# Patient Record
Sex: MALE | Race: OTHER | HISPANIC OR LATINO | URBAN - METROPOLITAN AREA
[De-identification: names, ages, dates, MRNs, and addresses within clinical notes are randomized per-mention and may not be internally consistent; named-entity substitution may affect disease eponyms.]

---

## 2020-08-07 ENCOUNTER — EMERGENCY (EMERGENCY)
Facility: HOSPITAL | Age: 70
LOS: 1 days | Discharge: HOME | End: 2020-08-07
Attending: EMERGENCY MEDICINE | Admitting: EMERGENCY MEDICINE
Payer: MEDICARE

## 2020-08-07 VITALS
RESPIRATION RATE: 16 BRPM | DIASTOLIC BLOOD PRESSURE: 88 MMHG | WEIGHT: 160.06 LBS | OXYGEN SATURATION: 98 % | TEMPERATURE: 98 F | HEART RATE: 68 BPM | HEIGHT: 64 IN | SYSTOLIC BLOOD PRESSURE: 131 MMHG

## 2020-08-07 VITALS
HEART RATE: 70 BPM | DIASTOLIC BLOOD PRESSURE: 68 MMHG | RESPIRATION RATE: 16 BRPM | SYSTOLIC BLOOD PRESSURE: 135 MMHG | OXYGEN SATURATION: 99 % | TEMPERATURE: 98 F

## 2020-08-07 PROCEDURE — 72110 X-RAY EXAM L-2 SPINE 4/>VWS: CPT | Mod: 26

## 2020-08-07 PROCEDURE — 73502 X-RAY EXAM HIP UNI 2-3 VIEWS: CPT | Mod: 26,RT

## 2020-08-07 PROCEDURE — 99284 EMERGENCY DEPT VISIT MOD MDM: CPT

## 2020-08-07 PROCEDURE — 73502 X-RAY EXAM HIP UNI 2-3 VIEWS: CPT

## 2020-08-07 PROCEDURE — 99284 EMERGENCY DEPT VISIT MOD MDM: CPT | Mod: 25

## 2020-08-07 PROCEDURE — 72110 X-RAY EXAM L-2 SPINE 4/>VWS: CPT

## 2020-08-07 RX ORDER — LIDOCAINE 4 G/100G
1 CREAM TOPICAL ONCE
Refills: 0 | Status: COMPLETED | OUTPATIENT
Start: 2020-08-07 | End: 2020-08-07

## 2020-08-07 RX ORDER — LIDOCAINE 4 G/100G
1 CREAM TOPICAL
Qty: 30 | Refills: 0
Start: 2020-08-07 | End: 2020-09-05

## 2020-08-07 RX ORDER — CYCLOBENZAPRINE HYDROCHLORIDE 10 MG/1
1 TABLET, FILM COATED ORAL
Qty: 21 | Refills: 0
Start: 2020-08-07

## 2020-08-07 RX ADMIN — LIDOCAINE 1 PATCH: 4 CREAM TOPICAL at 15:16

## 2020-08-07 RX ADMIN — Medication 500 MILLIGRAM(S): at 15:26

## 2020-08-07 RX ADMIN — Medication 500 MILLIGRAM(S): at 15:17

## 2020-08-07 NOTE — ED PROVIDER NOTE - MUSCULOSKELETAL, MLM
Spine appears normal, range of motion is not limited. +point tenderness right buttock, posterior hip area, no joint deformity or tenderness, full ROM, pulses and sensation intact

## 2020-08-07 NOTE — ED PROVIDER NOTE - OBJECTIVE STATEMENT
69 year old male with a PMHx of Arthritis, DM, HTN presents to the ED for pain on his right buttock x 6-7 months, worsening. Pt states that he has been taking Tylenol with minimal relief. No pain on the left side. Pt has not seen is doctor for this pain. Has had an MRI 10 years ago fro his Arthritis. PCP: Olivia Bell

## 2020-08-07 NOTE — ED PROVIDER NOTE - PATIENT PORTAL LINK FT
You can access the FollowMyHealth Patient Portal offered by [facility] by registering at the following website: [email or web address]. By joining [employer]’s FollowMyHealth portal, you will also be able to view your health information using other applications (apps) compatible with our system.

## 2020-08-07 NOTE — ED ADULT NURSE NOTE - OBJECTIVE STATEMENT
Pt came in ambulatory A/Ox 4 complains of intermittent worsening right hip/ buttock pain x 7 months now. Pt stated that he unable to make an appointment with his PMD and pain is worsening.

## 2020-08-07 NOTE — ED PROVIDER NOTE - NSFOLLOWUPINSTRUCTIONS_ED_ALL_ED_FT
Sciatica     Sciatica is pain, weakness, tingling, or loss of feeling (numbness) along the sciatic nerve. The sciatic nerve starts in the lower back and goes down the back of each leg. Sciatica usually goes away on its own or with treatment. Sometimes, sciatica may come back (recur).  What are the causes?  This condition happens when the sciatic nerve is pinched or has pressure put on it. This may be the result of:  A disk in between the bones of the spine bulging out too far (herniated disk).Changes in the spinal disks that occur with aging.A condition that affects a muscle in the butt.Extra bone growth near the sciatic nerve.A break (fracture) of the area between your hip bones (pelvis).Pregnancy. Tumor. This is rare.What increases the risk?  You are more likely to develop this condition if you:  Play sports that put pressure or stress on the spine.Have poor strength and ease of movement (flexibility).Have had a back injury in the past.Have had back surgery.Sit for long periods of time.Do activities that involve bending or lifting over and over again.Are very overweight (obese).What are the signs or symptoms?  Symptoms can vary from mild to very bad. They may include:  Any of these problems in the lower back, leg, hip, or butt:  Mild tingling, loss of feeling, or dull aches.Burning sensations.Sharp pains. Loss of feeling in the back of the calf or the sole of the foot.Leg weakness. Very bad back pain that makes it hard to move.These symptoms may get worse when you cough, sneeze, or laugh. They may also get worse when you sit or stand for long periods of time.  How is this treated?  This condition often gets better without any treatment. However, treatment may include:  Changing or cutting back on physical activity when you have pain.Doing exercises and stretching.Putting ice or heat on the affected area.Medicines that help:   To relieve pain and swelling. To relax your muscles. Shots (injections) of medicines that help to relieve pain, irritation, and swelling.Surgery.Follow these instructions at home:  Medicines     Take over-the-counter and prescription medicines only as told by your doctor.Ask your doctor if the medicine prescribed to you:  Requires you to avoid driving or using heavy machinery.Can cause trouble pooping (constipation). You may need to take these steps to prevent or treat trouble pooping:  Drink enough fluids to keep your pee (urine) pale yellow.Take over-the-counter or prescription medicines.Eat foods that are high in fiber. These include beans, whole grains, and fresh fruits and vegetables.Limit foods that are high in fat and sugar. These include fried or sweet foods.Managing pain         If told, put ice on the affected area.  Put ice in a plastic bag.Place a towel between your skin and the bag.Leave the ice on for 20 minutes, 2–3 times a day.If told, put heat on the affected area. Use the heat source that your doctor tells you to use, such as a moist heat pack or a heating pad.  Place a towel between your skin and the heat source.Leave the heat on for 20–30 minutes.Remove the heat if your skin turns bright red. This is very important if you are unable to feel pain, heat, or cold. You may have a greater risk of getting burned.Activity        Return to your normal activities as told by your doctor. Ask your doctor what activities are safe for you.Avoid activities that make your symptoms worse.Take short rests during the day.  When you rest for a long time, do some physical activity or stretching between periods of rest.Avoid sitting for a long time without moving. Get up and move around at least one time each hour.Exercise and stretch regularly, as told by your doctor.Do not lift anything that is heavier than 10 lb (4.5 kg) while you have symptoms of sciatica.  Avoid lifting heavy things even when you do not have symptoms.Avoid lifting heavy things over and over.When you lift objects, always lift in a way that is safe for your body. To do this, you should:  Bend your knees.Keep the object close to your body.Avoid twisting.General instructions     Stay at a healthy weight.Wear comfortable shoes that support your feet. Avoid wearing high heels.Avoid sleeping on a mattress that is too soft or too hard. You might have less pain if you sleep on a mattress that is firm enough to support your back.Keep all follow-up visits as told by your doctor. This is important.Contact a doctor if:  You have pain that:  Wakes you up when you are sleeping.Gets worse when you lie down.Is worse than the pain you have had in the past.Lasts longer than 4 weeks.You lose weight without trying.Get help right away if:  You cannot control when you pee (urinate) or poop (have a bowel movement).You have weakness in any of these areas and it gets worse:  Lower back.The area between your hip bones.Butt.Legs.You have redness or swelling of your back.You have a burning feeling when you pee.Summary  Sciatica is pain, weakness, tingling, or loss of feeling (numbness) along the sciatic nerve.This condition happens when the sciatic nerve is pinched or has pressure put on it.Sciatica can cause pain, tingling, or loss of feeling (numbness) in the lower back, legs, hips, and butt.Treatment often includes rest, exercise, medicines, and putting ice or heat on the affected area.This information is not intended to replace advice given to you by your health care provider. Make sure you discuss any questions you have with your health care provider.

## 2020-08-07 NOTE — ED PROVIDER NOTE - CARE PROVIDER_API CALL
Henrik Arambula  ORTHOPAEDIC SPORTS MEDICINE  [address] [place] [address]  Phone: [phone]  Fax: [phone]  Follow Up Time: 1-3 Days

## 2020-08-07 NOTE — ED PROVIDER NOTE - CLINICAL SUMMARY MEDICAL DECISION MAKING FREE TEXT BOX
Impression is right buttock pain, r/o Sciatica and Arthritis. Plan XR, pain medications and ortho follow up.

## 2020-09-02 ENCOUNTER — FORM ENCOUNTER (OUTPATIENT)
Age: 70
End: 2020-09-02

## 2020-11-17 ENCOUNTER — FORM ENCOUNTER (OUTPATIENT)
Age: 70
End: 2020-11-17

## 2021-01-11 ENCOUNTER — FORM ENCOUNTER (OUTPATIENT)
Age: 71
End: 2021-01-11

## 2021-02-09 ENCOUNTER — FORM ENCOUNTER (OUTPATIENT)
Age: 71
End: 2021-02-09

## 2021-02-09 PROBLEM — I10 ESSENTIAL (PRIMARY) HYPERTENSION: Chronic | Status: ACTIVE | Noted: 2020-08-07

## 2021-02-09 PROBLEM — E11.9 TYPE 2 DIABETES MELLITUS WITHOUT COMPLICATIONS: Chronic | Status: ACTIVE | Noted: 2020-08-07

## 2021-02-15 ENCOUNTER — FORM ENCOUNTER (OUTPATIENT)
Age: 71
End: 2021-02-15

## 2021-02-17 ENCOUNTER — FORM ENCOUNTER (OUTPATIENT)
Age: 71
End: 2021-02-17

## 2021-03-09 ENCOUNTER — FORM ENCOUNTER (OUTPATIENT)
Age: 71
End: 2021-03-09

## 2021-03-14 ENCOUNTER — FORM ENCOUNTER (OUTPATIENT)
Age: 71
End: 2021-03-14

## 2021-03-29 ENCOUNTER — FORM ENCOUNTER (OUTPATIENT)
Age: 71
End: 2021-03-29

## 2021-03-30 ENCOUNTER — OUTPATIENT (OUTPATIENT)
Dept: OUTPATIENT SERVICES | Facility: HOSPITAL | Age: 71
LOS: 1 days | Discharge: HOME | End: 2021-03-30

## 2021-03-30 DIAGNOSIS — N52.9 MALE ERECTILE DYSFUNCTION, UNSPECIFIED: ICD-10-CM

## 2021-03-30 LAB — GLUCOSE BLDC GLUCOMTR-MCNC: 136 MG/DL — HIGH (ref 70–99)

## 2021-03-30 NOTE — BRIEF OPERATIVE NOTE - NSICDXBRIEFPROCEDURE_GEN_ALL_CORE_FT
PROCEDURES:  Insertion of multi-component inflatable penile prosthesis 30-Mar-2021 14:04:26  Darrian Augustine

## 2021-04-07 ENCOUNTER — FORM ENCOUNTER (OUTPATIENT)
Age: 71
End: 2021-04-07

## 2021-04-11 ENCOUNTER — FORM ENCOUNTER (OUTPATIENT)
Age: 71
End: 2021-04-11

## 2021-04-16 NOTE — ED ADULT NURSE NOTE - SUICIDE SCREENING QUESTION 3
Patient aware and verbalized understanding.  Patient would like to come in for labs without an appointment.  Standing order labs placed for 4 times.    Staff message placed to enter more if needed.   No

## 2021-04-21 ENCOUNTER — FORM ENCOUNTER (OUTPATIENT)
Age: 71
End: 2021-04-21

## 2021-09-19 ENCOUNTER — FORM ENCOUNTER (OUTPATIENT)
Age: 71
End: 2021-09-19

## 2021-10-04 ENCOUNTER — FORM ENCOUNTER (OUTPATIENT)
Age: 71
End: 2021-10-04

## 2021-10-25 ENCOUNTER — FORM ENCOUNTER (OUTPATIENT)
Age: 71
End: 2021-10-25

## 2021-11-03 ENCOUNTER — FORM ENCOUNTER (OUTPATIENT)
Age: 71
End: 2021-11-03

## 2021-11-15 ENCOUNTER — FORM ENCOUNTER (OUTPATIENT)
Age: 71
End: 2021-11-15

## 2021-11-18 ENCOUNTER — FORM ENCOUNTER (OUTPATIENT)
Age: 71
End: 2021-11-18

## 2021-11-19 ENCOUNTER — OUTPATIENT (OUTPATIENT)
Dept: OUTPATIENT SERVICES | Facility: HOSPITAL | Age: 71
LOS: 1 days | Discharge: HOME | End: 2021-11-19

## 2021-11-19 LAB — GLUCOSE BLDC GLUCOMTR-MCNC: 138 MG/DL — HIGH (ref 70–99)

## 2021-12-01 ENCOUNTER — FORM ENCOUNTER (OUTPATIENT)
Age: 71
End: 2021-12-01

## 2021-12-22 ENCOUNTER — NON-APPOINTMENT (OUTPATIENT)
Age: 71
End: 2021-12-22

## 2021-12-22 ENCOUNTER — APPOINTMENT (OUTPATIENT)
Dept: ORTHOPEDIC SURGERY | Facility: CLINIC | Age: 71
End: 2021-12-22
Payer: MEDICARE

## 2021-12-22 VITALS
WEIGHT: 162 LBS | OXYGEN SATURATION: 96 % | HEIGHT: 64 IN | SYSTOLIC BLOOD PRESSURE: 165 MMHG | DIASTOLIC BLOOD PRESSURE: 91 MMHG | BODY MASS INDEX: 27.66 KG/M2 | HEART RATE: 65 BPM

## 2021-12-22 DIAGNOSIS — M54.41 LUMBAGO WITH SCIATICA, RIGHT SIDE: ICD-10-CM

## 2021-12-22 PROCEDURE — 99204 OFFICE O/P NEW MOD 45 MIN: CPT

## 2021-12-22 NOTE — PHYSICAL EXAM

## 2021-12-22 NOTE — HISTORY OF PRESENT ILLNESS
[de-identified] : This is a 71-year-old male that is here today for evaluation of his back and right-sided buttock and posterior thigh pain.  Unfortunately symptoms of been worsening over the past several months.  Although he can walk 5 blocks he does have back pain and leg pain when he walks at times.  He denies any bowel bladder issues.  He denies any saddle anesthesia.

## 2022-11-23 ENCOUNTER — EMERGENCY (EMERGENCY)
Facility: HOSPITAL | Age: 72
LOS: 1 days | Discharge: HOME | End: 2022-11-23
Attending: EMERGENCY MEDICINE | Admitting: EMERGENCY MEDICINE
Payer: MEDICARE

## 2022-11-23 VITALS
HEART RATE: 67 BPM | TEMPERATURE: 98 F | SYSTOLIC BLOOD PRESSURE: 144 MMHG | RESPIRATION RATE: 16 BRPM | WEIGHT: 177.91 LBS | OXYGEN SATURATION: 97 % | DIASTOLIC BLOOD PRESSURE: 82 MMHG | HEIGHT: 64 IN

## 2022-11-23 PROCEDURE — 99283 EMERGENCY DEPT VISIT LOW MDM: CPT

## 2022-11-23 PROCEDURE — 96372 THER/PROPH/DIAG INJ SC/IM: CPT

## 2022-11-23 PROCEDURE — 99284 EMERGENCY DEPT VISIT MOD MDM: CPT

## 2022-11-23 RX ORDER — TRAMADOL HYDROCHLORIDE 50 MG/1
1 TABLET ORAL
Qty: 20 | Refills: 0
Start: 2022-11-23 | End: 2022-11-25

## 2022-11-23 RX ORDER — KETOROLAC TROMETHAMINE 30 MG/ML
30 SYRINGE (ML) INJECTION ONCE
Refills: 0 | Status: DISCONTINUED | OUTPATIENT
Start: 2022-11-23 | End: 2022-11-23

## 2022-11-23 RX ORDER — LIDOCAINE 4 G/100G
1 CREAM TOPICAL ONCE
Refills: 0 | Status: COMPLETED | OUTPATIENT
Start: 2022-11-23 | End: 2022-11-23

## 2022-11-23 RX ADMIN — LIDOCAINE 1 PATCH: 4 CREAM TOPICAL at 10:59

## 2022-11-23 RX ADMIN — Medication 30 MILLIGRAM(S): at 11:30

## 2022-11-23 RX ADMIN — Medication 30 MILLIGRAM(S): at 10:59

## 2022-11-23 NOTE — ED PROVIDER NOTE - CLINICAL SUMMARY MEDICAL DECISION MAKING FREE TEXT BOX
#[id number]  Patient complaining of greater than 6 months of posterior neck pain for which he had a work-up including x-rays and MRI while he was in the [place] 2 months ago.  Patient was diagnosed with arthritis recommended for physical therapy but did not have patient has taken some Tylenol with mild relief.  Patient denies fevers chills weakness numbness trauma headache dizziness nausea vomiting.  Plan Toradol and lidocaine patch for analgesia outpatient follow-up with spine

## 2022-11-23 NOTE — ED PROVIDER NOTE - OBJECTIVE STATEMENT
72-year-old male with history of hypertension and diabetes presents with neck pain for the past 6 to 7 months.  Denies any injury or trauma.  Had x-rays and MRI in [place] 2 months ago, diagnosed with arthritis.  Recommended physical therapy but patient declined at that time since he has had physical therapy in the past which did not help.  was recommended to start taking collagen. Has been taking Tylenol over-the-counter with occasional mild improvement of pain.  Pain same quality and character as chronic pain he has been having the past 6 to 7 months.  Denies any fevers or recent illnesses.  Denies any history of cancer or recent unexplained weight loss.  Denies any weakness in extremities or numbness or tingling in extremities.  Pain moderate in nature.  Denies any headache, dizziness, confusion, one-sided weakness, or difficulty walking.  PCP Billy   [id number]

## 2022-11-23 NOTE — ED ADULT NURSE NOTE - DOES PATIENT HAVE ADVANCE DIRECTIVE
Use meloxicam instead of ibuprofen.  No prednisone today because I would like your hands to be red and swollen yet in 2 days when you see the rheumatologist.  Use heat and/or ice as it helps and perhaps try topical Lidoderm patches which you have  Take Cipro twice daily for presumed infection but obtain a urinalysis especially if any other blood work is obtained.  As we discussed this may be a kidney/bladder problem related to what we think is a connective tissue immune disorder.  
Unk

## 2022-11-23 NOTE — ED PROVIDER NOTE - CARE PROVIDER_API CALL
Stuart Lopez)  Orthopaedic Surgery Surgery  [address] [place] [address]  Phone: [phone]  Fax: [phone]  Follow Up Time: 1-3 Days    Joey Rosado)  Orthopaedic Surgery  [address] [place] [address]  Phone: [phone]  Fax: [phone]  Follow Up Time: 1-3 Days

## 2022-11-23 NOTE — ED PROVIDER NOTE - PROVIDER TOKENS
PROVIDER:[TOKEN:[[id number]],FOLLOWUP:[1-3 Days]],PROVIDER:[TOKEN:[[id number]],FOLLOWUP:[1-3 Days]]

## 2022-11-23 NOTE — ED PROVIDER NOTE - PROGRESS NOTE DETAILS
Patient stable.  Overall pain improved after Toradol.  Had recent x-rays and MRI 2 months ago which showed arthritic changes.  No red flags.  Stretches to neck explained and demonstrated.  Short course of tramadol prescribed for pain.  Recommend follow-up with spine specialist, referrals provided if needed.

## 2022-11-23 NOTE — ED PROVIDER NOTE - PATIENT PORTAL LINK FT
You can access the FollowMyHealth Patient Portal offered by [facility] by registering at the following website: [email or web address]. By joining [employer]’s FollowMyHealth portal, you will also be able to view your health information using other applications (apps) compatible with our system.

## 2022-11-23 NOTE — ED PROVIDER NOTE - NSFOLLOWUPINSTRUCTIONS_ED_ALL_ED_FT
Tylenol over-the-counter for pain  Short course of tramadol prescribed to take for severe pain that is not controlled with Tylenol  Ice or moist heat  Salonpas lidocaine patches over-the-counter, 12 hours on 12 hours off  Follow-up with spine specialist, referral provided if needed      Dolor en el rashid    LO QUE NECESITA SABER:    Es posible que usted tenga dolor de rashid repentino que aumenta rápidamente. O, en vez de esto, es posible que usted sienta que el dolor aumenta con el tiempo. El dolor de rashid podría desaparecer en unos días o semanas, o podría continuar por meses. El dolor podría ir y venir, o podría empeorar con ciertos movimientos. El dolor podría sentirse solamente en carter rashid, o podría pasarse a tori brazos, espalda u hombros. También podría sentir dolor que comienza en otra área de carter cuerpo y se pasa a carter rashid. Algunos tipos de dolor de rashid son permanentes.  [place] vertebral         INSTRUCCIONES SOBRE EL ARANZA HOSPITALARIA:    Regrese a la nargis de emergencias si:  •Usted tiene ike lesión que le provoca dolor en el rashid y dolor punzante debajo de tori brazos o piernas.      •Carter dolor de rashid se agrava repentinamente.      •Usted tiene dolor de rashid junto con entumecimiento, hormigueo o debilidad en tori brazos o piernas.      •Usted tiene rigidez en el rashid, dolor de song y fiebre.      Comuníquese con carter médico si:  •Usted tiene nuevos síntomas o tori síntomas empeoran.      •Tori síntomas continúan aún después del tratamiento.      •Usted tiene preguntas o inquietudes acerca de carter condición o cuidado.      Medicamentos:Es posible que usted necesite alguno de los siguientes:   •AINEcomo el ibuprofeno, ayudan a disminuir la inflamación, el dolor y la fiebre. Estos medicamentos pueden ser comprados sin orden de un médico. Pregunte a carter médico cuál medicamento chhaya y con qué frecuencia. Siga las indicaciones. Cuando no se sammy de la manera indicada, los medicamentos antiinflamatorios no esteroides pueden causar sangrado estomacal o problemas renales. Si usted está tomando un anticoagulante, siempre pregunte si los DICK son seguros para usted.      •Acetaminofénsirve para calmar el dolor y bajar la fiebre del yuliya. Pregunte a carter médico cuánto chhaya y con qué frecuencia. Siga las indicaciones. El acetaminofén puede causar daño en el hígado cuando no se nathan de forma correcta.      •Medicamentos esteroideospodría usarse para reducir la inflamación. [place] puede ayudarlo a aliviar el dolor a causa de la inflamación.      •[place] tori medicamentos darci se le haya indicado.Consulte con carter médico si usted sheryl que carter medicamento no le está ayudando o si presenta efectos secundarios. Infórmele al médico si usted es alérgico a algún medicamento. Mantenga ike lista actualizada de los medicamentos, las vitaminas y los productos herbales que nathan. Incluya los siguientes datos de los medicamentos: cantidad, frecuencia y motivo de administración. Traiga con usted la lista o los envases de las píldoras a tori citas de seguimiento. Lleve la lista de los medicamentos con usted en humble de ike emergencia.      Controle o evite el dolor de rashid:  •Repose el rashid darci se lo indiquen.No realice movimientos repentinos, darci voltear carter song rápidamente. Carter médico podría recomendarle que use un collarín cervical por un período corto de tiempo. El collarín evitará que usted mueva carter song. [place] ayudará a darle tiempo a carter rashid para que sane si es que ike lesión está provocando carter dolor. Pregunte a carter médico cuándo puede volver a practicar deportes o realizar otras actividades cotidianas.      •Aplique calor según indicaciones.El calor ayuda a aliviar el dolor y la inflamación. Use ike envoltura caliente o empape ike toalla pequeña en agua tibia. Escurra el exceso de agua. Aplique la venda caliente o la toalla por 20 minutos cada hora o darci se le indique.      •Aplique hielo según las indicaciones.El hielo ayuda a aliviar el dolor y la inflamación, y a evitar daño al tejido. Use un paquete con hielo o ponga hielo en ike bolsa. Cubra el paquete con hielo o la espalda con ike toalla antes de aplicarlo en carter rashid. Aplique el paquete de hielo o la bolsa por 15 minutos cada hora o darci se lo indiquen. Carter médico puede indicarle con qué frecuencia aplicar el hielo.      •Farhan ejercicios para el rashid darci se lo indiquen.Los ejercicios para el rashid ayudan a fortalecer los músculos y a aumentar el rango de movimiento. Carter médico le indicará cuáles ejercicios son adecuados para usted. Podría darle instrucciones o recomendarle que trabaje con un fisioterapeuta. Carter médico o terapeuta pueden asegurarse de que usted esté haciendo estos ejercicios correctamente.      •Mantenga ike buena postura.Trate de mantener carter song y hombros levantados cuando se siente. Si usted trabaja en frente de ike computadora, asegúrese de que el monitor esté al nivel adecuado. Usted no debería tener que mirar hacia abajo para cori la pantalla. Tampoco debe tener que inclinarse hacia adelante para poder leer lo que está en la pantalla. Asegúrese de que carter teclado, ratón y otros artículos de computadora estén colocados en donde usted no tenga que extender tori hombros para alcanzarlos. [place] a menudo si usted trabaja frente a ike computadora o permanece sentado joey largos períodos. Estírese o camine para mantener los músculos de carter rashid relajados.  Ergonomía adecuada           Acuda a tori consultas de control con carter médico según le indicaron:Carter médico podría referirlo a un especialista si carter dolor no mejora con el tratamiento. Anote tori preguntas para que se acuerde de hacerlas joey tori visitas.

## 2022-11-23 NOTE — ED PROVIDER NOTE - MUSCULOSKELETAL, MLM
diffuse soft tissue tenderness to cervical region. no pain with axial loading. full ROM, pain with rotation

## 2022-11-23 NOTE — ED ADULT NURSE NOTE - OBJECTIVE STATEMENT
71 y/o M PMH HTN, HLD, T2DM, presenting to ED with neck pain x 9 months. States he has been seeing spine doctor and had MRI done, told he was going to be medically managed but now has worsening pain. CLARK x4. Denies CP, SOB, n/v/d, fevers, chills, abdominal pain, urinary symptoms, weakness, fatigue, numbness, tingling in upper and lower extremities, HA, blurry vision. VSS updated on plan of care.

## 2023-01-12 ENCOUNTER — APPOINTMENT (OUTPATIENT)
Dept: UROLOGY | Facility: CLINIC | Age: 73
End: 2023-01-12
Payer: MEDICARE

## 2023-01-12 VITALS
WEIGHT: 168 LBS | HEIGHT: 64 IN | BODY MASS INDEX: 28.68 KG/M2 | SYSTOLIC BLOOD PRESSURE: 132 MMHG | DIASTOLIC BLOOD PRESSURE: 78 MMHG | TEMPERATURE: 98.2 F

## 2023-01-12 PROCEDURE — 99214 OFFICE O/P EST MOD 30 MIN: CPT

## 2023-01-12 NOTE — HISTORY OF PRESENT ILLNESS
[FreeTextEntry1] : The patient complains of a fold of the right cylinder protruding at the base of the penile shaft.

## 2023-01-12 NOTE — ASSESSMENT
[FreeTextEntry1] : The implant was inflated the implant was inflated and deflated and the fold disappeared.  The cylinders are slightly oversized however the erection is very good and I think that patient will not need any further surgery.  The patient was reassured that the fold is a part of the penile implant and there is limitations of the technology.  He was also told that the fold will not cause any erosion of the implant through the skin.

## 2023-03-17 ENCOUNTER — EMERGENCY (EMERGENCY)
Facility: HOSPITAL | Age: 73
LOS: 1 days | Discharge: HOME | End: 2023-03-17
Attending: INTERNAL MEDICINE | Admitting: INTERNAL MEDICINE
Payer: MEDICARE

## 2023-03-17 VITALS
OXYGEN SATURATION: 99 % | HEART RATE: 66 BPM | RESPIRATION RATE: 18 BRPM | TEMPERATURE: 98 F | DIASTOLIC BLOOD PRESSURE: 78 MMHG | SYSTOLIC BLOOD PRESSURE: 148 MMHG

## 2023-03-17 VITALS
WEIGHT: 158.73 LBS | TEMPERATURE: 98 F | HEART RATE: 65 BPM | DIASTOLIC BLOOD PRESSURE: 92 MMHG | RESPIRATION RATE: 18 BRPM | SYSTOLIC BLOOD PRESSURE: 160 MMHG | OXYGEN SATURATION: 99 % | HEIGHT: 64 IN

## 2023-03-17 PROCEDURE — 72050 X-RAY EXAM NECK SPINE 4/5VWS: CPT | Mod: 26

## 2023-03-17 PROCEDURE — 72050 X-RAY EXAM NECK SPINE 4/5VWS: CPT

## 2023-03-17 PROCEDURE — 99284 EMERGENCY DEPT VISIT MOD MDM: CPT

## 2023-03-17 PROCEDURE — 99283 EMERGENCY DEPT VISIT LOW MDM: CPT

## 2023-03-17 PROCEDURE — 99053 MED SERV 10PM-8AM 24 HR FAC: CPT

## 2023-03-17 RX ORDER — CYCLOBENZAPRINE HYDROCHLORIDE 10 MG/1
1 TABLET, FILM COATED ORAL
Qty: 30 | Refills: 0
Start: 2023-03-17 | End: 2023-03-26

## 2023-03-17 RX ORDER — CYCLOBENZAPRINE HYDROCHLORIDE 10 MG/1
10 TABLET, FILM COATED ORAL ONCE
Refills: 0 | Status: COMPLETED | OUTPATIENT
Start: 2023-03-17 | End: 2023-03-17

## 2023-03-17 RX ORDER — IBUPROFEN 200 MG
600 TABLET ORAL ONCE
Refills: 0 | Status: COMPLETED | OUTPATIENT
Start: 2023-03-17 | End: 2023-03-17

## 2023-03-17 RX ADMIN — Medication 600 MILLIGRAM(S): at 04:35

## 2023-03-17 RX ADMIN — Medication 600 MILLIGRAM(S): at 05:05

## 2023-03-17 RX ADMIN — CYCLOBENZAPRINE HYDROCHLORIDE 10 MILLIGRAM(S): 10 TABLET, FILM COATED ORAL at 04:35

## 2023-03-17 NOTE — ED PROVIDER NOTE - PATIENT PORTAL LINK FT
You can access the FollowMyHealth Patient Portal offered by [facility] by registering at the following website: [email or web address]. By joining [employer]’s FollowMyHealth portal, you will also be able to view your health information using other applications (apps) compatible with our system.

## 2023-03-17 NOTE — ED ADULT NURSE NOTE - ED COMFORT CARE
Reports prior gHTN in labor  - baseline CBC/CMP wnl  - 24hr urine wnl  - ASA discontinued today Patient informed

## 2023-03-17 NOTE — ED PROVIDER NOTE - CLINICAL SUMMARY MEDICAL DECISION MAKING FREE TEXT BOX
chronic neck pain, Xray is normal , Treated with NSAID and flexeril, improved and stable at discharge, O/P orthopedic referral given

## 2023-03-17 NOTE — ED ADULT NURSE NOTE - CHPI ED NUR SYMPTOMS NEG
mother, resident team, nursing no blurred vision/no change in level of consciousness/no confusion/no dizziness/no loss of consciousness/no nausea/no seizure/no syncope/no vomiting/no weakness

## 2023-03-17 NOTE — ED PROVIDER NOTE - CARE PROVIDER_API CALL
Henrik Arambula)  Orthopaedic Sports Medicine; Orthopaedic Surgery  [address]  [place] [address]  Phone: [phone]  Fax: [phone]  Follow Up Time: 1-3 Days  
Alert and oriented to person, place and time

## 2023-03-17 NOTE — ED PROVIDER NOTE - OBJECTIVE STATEMENT
neck pain, injury 2 months ago  neck pain 73 y/o male with muscular neck pain, injury 2 months ago  neck pain 71 y/o male with chronic muscular neck pain x  2 months. no headache, no fever no rash no toxemia, no chest pain, no SOB, no palpitations, no n/v,no neuro changes.

## 2023-03-17 NOTE — ED PROVIDER NOTE - SIGNIFICANT NEGATIVE FINDINGS
no headache, no fever no rash no toxemia, no chest pain, no SOB, no palpitations, no n/v,no neuro changes.

## 2023-03-20 ENCOUNTER — APPOINTMENT (OUTPATIENT)
Dept: ORTHOPEDIC SURGERY | Facility: CLINIC | Age: 73
End: 2023-03-20
Payer: MEDICARE

## 2023-03-20 VITALS
BODY MASS INDEX: 29.02 KG/M2 | TEMPERATURE: 97.5 F | OXYGEN SATURATION: 98 % | SYSTOLIC BLOOD PRESSURE: 131 MMHG | HEIGHT: 64 IN | DIASTOLIC BLOOD PRESSURE: 77 MMHG | WEIGHT: 170 LBS | HEART RATE: 58 BPM

## 2023-03-20 DIAGNOSIS — M54.2 CERVICALGIA: ICD-10-CM

## 2023-03-20 PROCEDURE — 99214 OFFICE O/P EST MOD 30 MIN: CPT

## 2023-03-20 PROCEDURE — 72040 X-RAY EXAM NECK SPINE 2-3 VW: CPT

## 2023-04-04 ENCOUNTER — NON-APPOINTMENT (OUTPATIENT)
Age: 73
End: 2023-04-04

## 2023-04-20 ENCOUNTER — APPOINTMENT (OUTPATIENT)
Dept: ORTHOPEDIC SURGERY | Facility: CLINIC | Age: 73
End: 2023-04-20
Payer: MEDICARE

## 2023-04-20 VITALS — SYSTOLIC BLOOD PRESSURE: 126 MMHG | HEART RATE: 66 BPM | OXYGEN SATURATION: 98 % | DIASTOLIC BLOOD PRESSURE: 70 MMHG

## 2023-04-20 VITALS — BODY MASS INDEX: 28.68 KG/M2 | HEIGHT: 64 IN | TEMPERATURE: 97.4 F | WEIGHT: 168 LBS

## 2023-04-20 DIAGNOSIS — M47.812 SPONDYLOSIS W/OUT MYELOPATHY OR RADICULOPATHY, CERVICAL REGION: ICD-10-CM

## 2023-04-20 PROCEDURE — 99214 OFFICE O/P EST MOD 30 MIN: CPT

## 2024-04-29 ENCOUNTER — APPOINTMENT (OUTPATIENT)
Dept: UROLOGY | Facility: CLINIC | Age: 74
End: 2024-04-29
Payer: MEDICARE

## 2024-04-29 VITALS
HEIGHT: 64 IN | TEMPERATURE: 97.3 F | SYSTOLIC BLOOD PRESSURE: 124 MMHG | BODY MASS INDEX: 28.68 KG/M2 | WEIGHT: 168 LBS | DIASTOLIC BLOOD PRESSURE: 74 MMHG

## 2024-04-29 DIAGNOSIS — Z87.438 PERSONAL HISTORY OF OTHER DISEASES OF MALE GENITAL ORGANS: ICD-10-CM

## 2024-04-29 PROCEDURE — 99215 OFFICE O/P EST HI 40 MIN: CPT

## 2024-05-05 PROBLEM — Z87.438 HISTORY OF ERECTILE DYSFUNCTION: Status: ACTIVE | Noted: 2023-01-12

## 2024-05-05 NOTE — HISTORY OF PRESENT ILLNESS
[FreeTextEntry1] : 73M w/ ED S/P Removal and Repositioning of right cylinder on 11/19/2021 S/P Insertion of IPP on 3/30/2021 Coloplast Touch doing well

## 2024-05-05 NOTE — END OF VISIT
[FreeTextEntry3] :  The complete time calculation (45 minutes) for this patient encounter, includes a review of the patient's past medical records pertinent to his chief complaint, including medical, and surgical history, review of medications taken and of previous visits with other health care providers. In addition to the time spent with the patient, the total time calculation also includes the time necessary to document all of the formation gathered during this session into the patient's electronic health records.   [Time Spent: ___ minutes] : I have spent [unfilled] minutes of time on the encounter.

## 2024-05-05 NOTE — PLAN
[TextEntry] : A/P: 73M w/ ED - S/P Removal and Repositioning of right cylinder on 11/19/2021 - S/P Insertion of IPP on 3/30/2021 Coloplast Touch - doing well - still has right fold - will require cardiac clearance to be schedule

## 2024-05-05 NOTE — PHYSICAL EXAM
[General Appearance - Well Developed] : well developed [General Appearance - Well Nourished] : well nourished [Heart Rate And Rhythm] : heart rate and rhythm were normal [] : no respiratory distress [Respiration, Rhythm And Depth] : normal respiratory rhythm and effort [Bowel Sounds] : normal bowel sounds [Abdomen Soft] : soft [de-identified] : The patient is status post insertion of inflatable multicomponent inflatable penile prosthesis in the past.  The overall appearance is excellent there is no edema, swelling or erythema. The incision is well-healed the edges are well approximated there is no discharge.  Cylinders sizing is excellent.  The distal tips are well positioned into the mid glans.  The appearance of the cylinders deflated and the shaft of the penis flaccid is also good.  Scrotal skin is intact the location of the pump is excellent it is in in in the back of the scrotum, well concealed, yet accessible to manipulation.  There is no tethering of the pump.  The reservoir in the lower quadrant of the abdomen is nonpalpable.

## 2024-05-23 ENCOUNTER — APPOINTMENT (OUTPATIENT)
Dept: UROLOGY | Facility: CLINIC | Age: 74
End: 2024-05-23
Payer: MEDICARE

## 2024-05-23 VITALS
HEIGHT: 64 IN | TEMPERATURE: 97.2 F | BODY MASS INDEX: 29.02 KG/M2 | WEIGHT: 170 LBS | DIASTOLIC BLOOD PRESSURE: 78 MMHG | SYSTOLIC BLOOD PRESSURE: 120 MMHG

## 2024-05-23 DIAGNOSIS — Z01.818 ENCOUNTER FOR OTHER PREPROCEDURAL EXAMINATION: ICD-10-CM

## 2024-05-23 PROCEDURE — 51725 SIMPLE CYSTOMETROGRAM: CPT

## 2024-05-23 PROCEDURE — 99215 OFFICE O/P EST HI 40 MIN: CPT | Mod: 25

## 2024-05-23 PROCEDURE — 51741 ELECTRO-UROFLOWMETRY FIRST: CPT

## 2024-05-23 PROCEDURE — 51798 US URINE CAPACITY MEASURE: CPT

## 2024-05-23 PROCEDURE — 52000 CYSTOURETHROSCOPY: CPT

## 2024-05-23 NOTE — PLAN
[TextEntry] : A/P: 73M w/ ED - preop cysto today - plan for RR The patient presents with with the chief complaint of a mechanical breakdown of his penile implant. The patient scheduled this consultation to discuss the different treatment options available for his malfunctioning implant. The following note describes the conversation that was performed today during the consultation.   I reviewed the Patient History Form which the patient filled out.  In discussing penile implant replacement surgery, the patient was made aware of the different types of penile implants- including semirigid devices, 2-piece or Ambicor (AMS) devices, and the inflatable penile prosthesis with 3 components. I went on to mention that there are 2 brands of devices, Coloplast and AMS, and that the AMS is impregnated with antibiotic (inhibizone), and the Coloplast is dipped then coated with an antibiotic. I also referred him to my website in order to obtain additional information about the types of implants available.  He felt he would defer to my judgment as to which device to use.  I also described the highlights and benefits of the "No-Touch" surgical technique and outcome data including number of procedures previously performed and updated rate of infection. In this initial discussion of the penile implant option, I made sure we had a very long and daniel discussion about the risks.  I stated that, first and foremost, infection is the most dreaded risk and complication, which range in incidence from 1-3% of all cases performed in the [place], but that in my hands, using the "No-Touch" technique the incidence of infection is less than 1%.  I stated that should infection occur, the entire device would need to be removed, which typically happens in the first several weeks after surgery.  I explained that should this occur, there would likely be corporal fibrosis, scarring, penile shortening and even penile necrosis and disfigurement.  I said that while I would do absolutely everything possible to reduce and mitigate this risk, if it occur, the device would have to be removed, and then a salvage procedure with a semi-rigid implant possibly done, or the device would have to be removed with delayed re-implantation, or simply avoid future surgery completely.  I explained what this salvage procedure is, and that a new implant could be placed in the same setting with a complex irrigation of antibiotics and saline lavage, but that the infection risk at this salvage procedure is even higher, up to 30%. Furthermore the possible need for hospitalization, prolonged intravenous antibiotics and need further additional surgery was also discussed.  The patient was informed that if the salvage operation failed or if the infected implant were to be removed completely that significant shortening of the penis would occur making implantation of another device very difficult with very poor outcome and patient satisfaction. I explained that this is a real and significant risk that has to be weighed and considered.  Next I expounded on the other risks of the operation.  These include injury to the urethra or bladder, and should these occur, the operation would have to be altered or aborted.  I explained that very rarely, vascular injury and bleeding can happen, and if iliac vein injury occurs from reservoir placement, this could be catastrophic and result in major blood loss and theoretically risk of leg loss in severe instances.    I went on to discuss that after the implant is placed, penile shortening could likely occur, and this is up to 1-2cm total.  Some of this is due to lack of glans engorgement, though MUSE could be used post-operatively to reduce this factor.  Next I also explained the risk of dissatisfaction with the cosmetic or functional result of the device, meaning that he could simply be unhappy with the result.  Some people find that while they have a good full erection, they have changes in sensation, difficulty obtaining an orgasm, and dissatisfaction with sex in general.  I made sure he verbalized and demonstrated a good understanding of these points.  Next, I explained the risk of device breakage or failure, and future operations might be needed should this occur to fix the device tubing breakages with fluid leaks.  There is also a risk of auto-inflation, and even inability to successfully use the device due to technical considerations and inability to use or find the pump.  I did state that I would be available to him to teach him and train him to use his device, and also available to treat any other issue mentioned above such asdevice breakage or auto-inflation.  Next we discussed the fact that rarely further minor surgery may be needed to make final adjustments to the penile implant. Reasons for this would be to adjust the length of the cylinders, reposition the pump or location of the reservoir.   Prior to scheduling surgery the patient was asked to read the material which is provided to him today, to see a cardiologist to obtain a medical clearance, to visit our website [email or web address]   to obtain additional information, to call patients who were previously implanted and to discuss his options with his partner. Before leaving the consultation, I made sure he verbalized understanding all the risk and benefits, and pros and cons of replacement surgery.  He had the ability to ask questions, and I also explained to him what to expect from the surgery.

## 2024-05-23 NOTE — ASSESSMENT
[FreeTextEntry1] : LIANA ALLEN [birth year]  [birth year] Procedure: Flexible Cystourethroscopy Location: [facility] at [address] [place] [address] Surgeon: REBECCA Pearl M.D. 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  The patient was placed in the dorsal lithotomy position, prepped and draped in the usual standard sterile fashion with surgical Betadine soap and wash. 2% lidocaine jelly was inserted intraurethrally via the meatus, and a clamp was applied to the penis and lidocaine jelly was allowed to remain in place for 5 minutes. The meatus was then intubated with a #16 [place] flexible disposable cystoscope. Visual cystourethroscopic examination was initiated under sterile water irrigation. The following findings were noted: The anterior urethra was normal. The Bulbar urethra was also normal. The membranous urethra was normal without any strictures. The prostatic urethra, lateral lobe and verumontanum appeared consistent with BPH. The prostatic urethra was mildly obstructed with bilobar hypertrophy and an elevated bladder neck. Ureteral orifices were identified, and clear efflux of urine was observed bilaterally. The bladder was examined in its entirety in a systematic fashion.  Trabeculation observed: Moderate No mucosal abnormalities, stone, tumors, foreign bodies or diverticula identified.  On retroflex, there was no significant prostatic tissue protruding into the bladder. At the end of the procedure, the flexible cystoscope was removed without difficulty.  Complications: None The patient tolerated the procedure well and he was discharged to home in stable condition.  CYSTOMETROGRAM: Preop Diagnosis: Increased Frequency of urination.  Postop Diagnosis: Increased Frequency of urination. Anesthesia: 2 % Intraurethral Lidocaine Jelly  Medication: Ciprofloxacin Complications: None  Procedure Note:  The findings observed are described in the cystoscopy report. With the flexible cystocope indwelling into the bladder, a sterile line of intravenous saline was connected into a manometer. This allowed us to measure the amount of fluid instilled and intravesical pressure.   The following findings of the cystometrogram were noted.  Bladder wall compliance: Normal Functional bladder capacity:   523  cc The patient perceived a first sensation that the bladder was filling with saline at:  225   cc His first urge to void was observed at 523   cc of saline.  His post void residual was measured at  111   cc Impression: Voiding proprioception: normal  Detrusor instability: not present  Summary: Normal study and no contraindication to proceeding with the surgery. The patient tolerated the procedure well.  BLADDER SCAN: Indication: Increased frequency of urination. Initial Volume: 523   cc PVR: 111  cc Results: Urinary retention Recommendations: No Therapy Needed.  URO FLOWMETRY: Indication: Increased frequency of urination. Urinary Flow Rate:  31.0  m/s Results: Urinary retention Recommendations: No therapy needed.

## 2024-05-23 NOTE — PHYSICAL EXAM
[General Appearance - Well Developed] : well developed [General Appearance - Well Nourished] : well nourished [Heart Rate And Rhythm] : heart rate and rhythm were normal [] : no respiratory distress [Respiration, Rhythm And Depth] : normal respiratory rhythm and effort [Bowel Sounds] : normal bowel sounds [Abdomen Soft] : soft [de-identified] : Penile implant examination:   The overall appearance of the penis and scrotum is excellent.  There is minimal edema and swelling.  There is no bruising noted.  No erythema.  The incision is clean and dry.  The skin edges are well approximated.  The cylinders of the implant are appropriately sized with the cylinder tip well-placed in the mid glans.  Scrotal skin is intact.  The location of the pump is good.  It is concealed yet easily accessible.  There is no tethering of the pump to the skin. The reservoir is well-positioned and nonpalpable. The implant was cycled, the cylinders failed to inflate, and the pump is flat indicating a fluid loss from the device.

## 2024-05-24 RX ORDER — OXYCODONE AND ACETAMINOPHEN 5; 325 MG/1; MG/1
5-325 TABLET ORAL
Qty: 40 | Refills: 0 | Status: ACTIVE | COMMUNITY
Start: 2024-05-24 | End: 1900-01-01

## 2024-05-24 RX ORDER — TRAMADOL HYDROCHLORIDE 50 MG/1
50 TABLET, COATED ORAL
Qty: 14 | Refills: 0 | Status: ACTIVE | COMMUNITY
Start: 2024-05-24 | End: 1900-01-01

## 2024-05-24 RX ORDER — CEPHALEXIN 250 MG/1
250 CAPSULE ORAL
Qty: 84 | Refills: 0 | Status: ACTIVE | COMMUNITY
Start: 2024-05-24 | End: 1900-01-01

## 2024-05-24 RX ORDER — CHLORHEXIDINE GLUCONATE 213 G/1000ML
4 SOLUTION TOPICAL
Qty: 1 | Refills: 0 | Status: ACTIVE | COMMUNITY
Start: 2024-05-24 | End: 1900-01-01

## 2024-05-24 RX ORDER — MAGNESIUM HYDROXIDE 400 MG/5ML
7.75 SUSPENSION, ORAL (FINAL DOSE FORM) ORAL
Qty: 355 | Refills: 0 | Status: ACTIVE | COMMUNITY
Start: 2024-05-24 | End: 1900-01-01

## 2024-05-24 RX ORDER — DOCUSATE SODIUM 100 MG/1
100 CAPSULE ORAL TWICE DAILY
Qty: 28 | Refills: 0 | Status: ACTIVE | COMMUNITY
Start: 2024-05-24 | End: 1900-01-01

## 2024-05-24 RX ORDER — IBUPROFEN 600 MG/1
600 TABLET, FILM COATED ORAL
Qty: 30 | Refills: 0 | Status: ACTIVE | COMMUNITY
Start: 2024-05-24 | End: 1900-01-01

## 2024-06-04 NOTE — ASU PATIENT PROFILE, ADULT - TEACHING/LEARNING FACTORS IMPACT ABILITY TO LEARN
We have sent two portal messages advising pt to schedule a telephone visit Per Dr ECHEVERRIA [facility] he understands it is hard for pt to ambulate or get around but would like him to have a follow up to ensure he receives his refills. He can schedule a telephonic visit.  Please schedule when pt returns call
none

## 2024-06-05 ENCOUNTER — OUTPATIENT (OUTPATIENT)
Dept: OUTPATIENT SERVICES | Facility: HOSPITAL | Age: 74
LOS: 1 days | Discharge: HOME | End: 2024-06-05
Payer: MEDICARE

## 2024-06-05 ENCOUNTER — APPOINTMENT (OUTPATIENT)
Dept: UROLOGY | Facility: HOSPITAL | Age: 74
End: 2024-06-05
Payer: MEDICARE

## 2024-06-05 ENCOUNTER — TRANSCRIPTION ENCOUNTER (OUTPATIENT)
Age: 74
End: 2024-06-05

## 2024-06-05 VITALS — OXYGEN SATURATION: 99 % | DIASTOLIC BLOOD PRESSURE: 92 MMHG | SYSTOLIC BLOOD PRESSURE: 148 MMHG

## 2024-06-05 VITALS
OXYGEN SATURATION: 98 % | TEMPERATURE: 97 F | RESPIRATION RATE: 16 BRPM | DIASTOLIC BLOOD PRESSURE: 76 MMHG | WEIGHT: 156.31 LBS | HEART RATE: 73 BPM | HEIGHT: 64 IN | SYSTOLIC BLOOD PRESSURE: 129 MMHG

## 2024-06-05 DIAGNOSIS — Z90.49 ACQUIRED ABSENCE OF OTHER SPECIFIED PARTS OF DIGESTIVE TRACT: Chronic | ICD-10-CM

## 2024-06-05 DIAGNOSIS — Z96.0 PRESENCE OF UROGENITAL IMPLANTS: Chronic | ICD-10-CM

## 2024-06-05 LAB — GLUCOSE BLDC GLUCOMTR-MCNC: 203 MG/DL — HIGH (ref 70–99)

## 2024-06-05 PROCEDURE — 54235 NJX CORPORA CAVERNOSA RX AGT: CPT

## 2024-06-05 PROCEDURE — 82962 GLUCOSE BLOOD TEST: CPT

## 2024-06-05 PROCEDURE — 54410 REMOVE/REPLACE PENIS PROSTH: CPT

## 2024-06-05 PROCEDURE — 54360 PENIS PLASTIC SURGERY: CPT

## 2024-06-05 PROCEDURE — 54360 PENIS PLASTIC SURGERY: CPT | Mod: GC

## 2024-06-05 PROCEDURE — 54410 REMOVE/REPLACE PENIS PROSTH: CPT | Mod: GC,22

## 2024-06-05 PROCEDURE — 54235 NJX CORPORA CAVERNOSA RX AGT: CPT | Mod: GC

## 2024-06-05 PROCEDURE — C1889: CPT

## 2024-06-05 PROCEDURE — C1813: CPT

## 2024-06-05 DEVICE — SURGCEL 4 X 8"
Type: IMPLANTABLE DEVICE | Status: NON-FUNCTIONAL
Removed: 2024-06-05

## 2024-06-05 DEVICE — IMP PENILE TITAN CYL PUMP SET SCROTAL 0 ANGLE 20CM
Type: IMPLANTABLE DEVICE | Status: NON-FUNCTIONAL
Removed: 2024-06-05

## 2024-06-05 DEVICE — SURGIFLO HEMOSTATIC MATRIX KIT
Type: IMPLANTABLE DEVICE | Status: NON-FUNCTIONAL
Removed: 2024-06-05

## 2024-06-05 DEVICE — KIT PENILE TITAN ASSEMBLY STANDARD
Type: IMPLANTABLE DEVICE | Status: NON-FUNCTIONAL
Removed: 2024-06-05

## 2024-06-05 DEVICE — RESERVIOR TITAN CLOVERLEAF 75CC
Type: IMPLANTABLE DEVICE | Status: NON-FUNCTIONAL
Removed: 2024-06-05

## 2024-06-05 RX ORDER — OXYCODONE HYDROCHLORIDE 5 MG/1
5 TABLET ORAL ONCE
Refills: 0 | Status: DISCONTINUED | OUTPATIENT
Start: 2024-06-05 | End: 2024-06-05

## 2024-06-05 RX ORDER — ACETAMINOPHEN 500 MG
650 TABLET ORAL ONCE
Refills: 0 | Status: DISCONTINUED | OUTPATIENT
Start: 2024-06-05 | End: 2024-06-05

## 2024-06-05 RX ORDER — ROSUVASTATIN CALCIUM 5 MG/1
1 TABLET ORAL
Refills: 0 | DISCHARGE

## 2024-06-05 RX ORDER — SODIUM CHLORIDE 9 MG/ML
1000 INJECTION, SOLUTION INTRAVENOUS
Refills: 0 | Status: DISCONTINUED | OUTPATIENT
Start: 2024-06-05 | End: 2024-06-05

## 2024-06-05 RX ORDER — OMEPRAZOLE 10 MG/1
1 CAPSULE, DELAYED RELEASE ORAL
Refills: 0 | DISCHARGE

## 2024-06-05 RX ORDER — ONDANSETRON 8 MG/1
4 TABLET, FILM COATED ORAL ONCE
Refills: 0 | Status: DISCONTINUED | OUTPATIENT
Start: 2024-06-05 | End: 2024-06-05

## 2024-06-05 RX ORDER — GENTAMICIN SULFATE 40 MG/ML
240 VIAL (ML) INJECTION ONCE
Refills: 0 | Status: COMPLETED | OUTPATIENT
Start: 2024-06-05 | End: 2024-06-05

## 2024-06-05 RX ORDER — AMLODIPINE BESYLATE 2.5 MG/1
1 TABLET ORAL
Refills: 0 | DISCHARGE

## 2024-06-05 RX ORDER — SITAGLIPTIN AND METFORMIN HYDROCHLORIDE 500; 50 MG/1; MG/1
1 TABLET, FILM COATED ORAL
Refills: 0 | DISCHARGE

## 2024-06-05 RX ORDER — ASPIRIN/CALCIUM CARB/MAGNESIUM 324 MG
0 TABLET ORAL
Refills: 0 | DISCHARGE

## 2024-06-05 RX ORDER — ACETAMINOPHEN 500 MG
1000 TABLET ORAL ONCE
Refills: 0 | Status: COMPLETED | OUTPATIENT
Start: 2024-06-05 | End: 2024-06-05

## 2024-06-05 RX ORDER — HYDROMORPHONE HYDROCHLORIDE 2 MG/ML
0.25 INJECTION INTRAMUSCULAR; INTRAVENOUS; SUBCUTANEOUS
Refills: 0 | Status: DISCONTINUED | OUTPATIENT
Start: 2024-06-05 | End: 2024-06-05

## 2024-06-05 RX ORDER — VANCOMYCIN HCL 1 G
1250 VIAL (EA) INTRAVENOUS ONCE
Refills: 0 | Status: COMPLETED | OUTPATIENT
Start: 2024-06-05 | End: 2024-06-05

## 2024-06-05 RX ADMIN — Medication 1000 MILLIGRAM(S): at 15:00

## 2024-06-05 RX ADMIN — Medication 400 MILLIGRAM(S): at 14:13

## 2024-06-05 RX ADMIN — Medication 187.5 MILLIGRAM(S): at 08:26

## 2024-06-05 RX ADMIN — Medication 100 MILLIGRAM(S): at 10:12

## 2024-06-05 NOTE — ASU DISCHARGE PLAN (ADULT/PEDIATRIC) - CARE PROVIDER_API CALL
Dae, Khris  Urology  [address] [place] [id number]  Phone: [phone]  Fax: [phone]  Follow Up Time:

## 2024-06-05 NOTE — BRIEF OPERATIVE NOTE - NSICDXBRIEFPOSTOP_GEN_ALL_CORE_FT
POST-OP DIAGNOSIS:  Malfunction of genitourinary device or graft 05-Jun-2024 13:39:46  Avel Montalvo

## 2024-06-05 NOTE — BRIEF OPERATIVE NOTE - NSICDXBRIEFPREOP_GEN_ALL_CORE_FT
PRE-OP DIAGNOSIS:  Malfunction of genitourinary device or graft 05-Jun-2024 13:39:43  Avel Montalvo

## 2024-06-05 NOTE — ASU DISCHARGE PLAN (ADULT/PEDIATRIC) - NS MD DC FALL RISK RISK
For information on Fall & Injury Prevention, visit: [email or web address] OR  [email or web address] OR  https://www.cdc.gov/steadi/patient.html

## 2024-06-05 NOTE — BRIEF OPERATIVE NOTE - NSICDXBRIEFPROCEDURE_GEN_ALL_CORE_FT
PROCEDURES:  Removal and replacement of multi-component inflatable penile prosthesis 05-Jun-2024 13:39:33  Avel Montalvo

## 2024-06-05 NOTE — ASU DISCHARGE PLAN (ADULT/PEDIATRIC) - ASU DC SPECIAL INSTRUCTIONSFT
INFLATABLE PENILE PROSTHESIS    Remove your catheter 6/8 at Noon  Warm baths may start on Saturday after the catheter is removed  Follow up with Dr. Pearl in 2-3 weeks.     SURGICAL WOUND: There are often lumps and bumps that can be felt in the scrotum on either or both sides up to two (2) months or more post operatively. These are of no concern and with time they will soften and disappear.  Any “black and blue” bruising areas will also resolve.  Normally, there is also swelling of the scrotum post operatively. Sometimes the tissue fluid which causes the swelling migrates to the penile skin and can look alarming; with time, all the swelling will eventually subside but may take weeks.  A scrotal support and scrotal fluffs should be worn at all times for the next few weeks, unless bathing, to minimize this swelling. You may apply an ice-pack for 15 minutes out of every hour for the first 24 -36 hours to minimize pain and swelling.    STITCHES: The stitches in the incision will need to be removed. Sometimes skin stitches may open, allowing a slight gaping of the incision. This is no problem if you keep the area clean.  There is a bluish colored waterproof glue over the incision as well – the glue will peel away and fall off on its own over a couple of weeks.      CATHETER: Some patients are sent home with a Izquierdo catheter, while others go home urinating on their own. A Izquierdo catheter continuously drains the urine from the bladder. If you still have a catheter, the nurses will review instructions and care before you go home. For men, you may have a prescription for lidocaine jelly to apply to the tip of your penis, as needed, for catheter related discomfort.     PAIN: You may have some intermittent pain for up to six (6) weeks post operatively. Pain does not signify any problem unless associated with fever, chills, or inability to void.  If you experience any fevers or chills please call immediately as this may be signs of an infection. You may take Tylenol (acetaminophen) 650-975mg and/or Motrin (ibuprofen) 400-600mg, both available over the counter, for pain every 6 hours as needed. Do not exceed 4000mg of Tylenol (acetaminophen) daily. You may alternate these medications such that you take one or the other every 3 hours for around the clock pain coverage. For severe pain, take Percocet 5/325mg every 6 hours.     ANTIBIOTICS: You may be given a prescription for an antibiotic, please take this medication as instructed and be sure to complete the entire course.     STOOL SOFTENERS: Do not allow yourself to become constipated as straining may cause bleeding. Take stool softeners or a laxative (ex. Miralax, Colace, Senokot, ExLax, etc), available over the counter, if taking Percocet.     ANTICOAGULATION: Continue your aspirin per usual    BATHING: You may sponge bath 24 hours after surgery, but no water to the surgical incision. Warm baths may start Saturday 6/8. .    DIET: You may resume your regular diet and regular medication regimen.    ACTIVITY: No heavy lifting or strenuous exercise until you are evaluated at your post-operative appointment. Otherwise, you may return to your usual level of physical activity.    FOLLOW-UP: If you did not already schedule your post-operative appointment, please call your urologist to schedule and follow-up appointment. 2-3 weeks with Dr. Pearl    CALL YOUR UROLOGIST IF: You have any bleeding that does not stop, inability to void >8 hours, fever over 100.4 F, chills, persistent nausea/vomiting, changes in your incision concerning for infection, or if your pain is not controlled on your discharge pain medications.

## 2024-06-11 PROBLEM — M19.90 UNSPECIFIED OSTEOARTHRITIS, UNSPECIFIED SITE: Chronic | Status: ACTIVE | Noted: 2020-08-07

## 2024-06-11 PROBLEM — E78.00 PURE HYPERCHOLESTEROLEMIA, UNSPECIFIED: Chronic | Status: ACTIVE | Noted: 2024-06-04

## 2024-06-11 PROBLEM — K21.9 GASTRO-ESOPHAGEAL REFLUX DISEASE WITHOUT ESOPHAGITIS: Chronic | Status: ACTIVE | Noted: 2024-06-04

## 2024-06-11 PROBLEM — M19.90 UNSPECIFIED OSTEOARTHRITIS, UNSPECIFIED SITE: Chronic | Status: ACTIVE | Noted: 2024-06-04

## 2024-06-11 PROBLEM — R06.83 SNORING: Chronic | Status: ACTIVE | Noted: 2024-06-04

## 2024-06-11 PROBLEM — R35.0 FREQUENCY OF MICTURITION: Chronic | Status: ACTIVE | Noted: 2024-06-04

## 2024-06-11 PROBLEM — N52.9 MALE ERECTILE DYSFUNCTION, UNSPECIFIED: Chronic | Status: ACTIVE | Noted: 2024-06-04

## 2024-06-20 ENCOUNTER — APPOINTMENT (OUTPATIENT)
Dept: UROLOGY | Facility: CLINIC | Age: 74
End: 2024-06-20

## 2024-06-20 VITALS
SYSTOLIC BLOOD PRESSURE: 116 MMHG | BODY MASS INDEX: 29.02 KG/M2 | DIASTOLIC BLOOD PRESSURE: 64 MMHG | WEIGHT: 170 LBS | TEMPERATURE: 97.5 F | HEIGHT: 64 IN

## 2024-06-20 DIAGNOSIS — Z00.00 ENCOUNTER FOR GENERAL ADULT MEDICAL EXAMINATION W/OUT ABNORMAL FINDINGS: ICD-10-CM

## 2024-06-20 DIAGNOSIS — Z48.816 ENCOUNTER FOR SURGICAL AFTERCARE FOLLOWING SURGERY ON THE GENITOURINARY SYSTEM: ICD-10-CM

## 2024-06-20 DIAGNOSIS — T83.420A DISPLACEMENT OF IMPLANTED PENILE PROSTHESIS, INITIAL ENCOUNTER: ICD-10-CM

## 2024-06-20 DIAGNOSIS — Z09 ENCOUNTER FOR FOLLOW-UP EXAMINATION AFTER COMPLETED TREATMENT FOR CONDITIONS OTHER THAN MALIGNANT NEOPLASM: ICD-10-CM

## 2024-06-20 DIAGNOSIS — Z96.0 PRESENCE OF UROGENITAL IMPLANTS: ICD-10-CM

## 2024-06-20 DIAGNOSIS — T83.410D BREAKDOWN (MECHANICAL) OF IMPLANTED PENILE PROSTHESIS, SUBSEQUENT ENCOUNTER: ICD-10-CM

## 2024-06-20 DIAGNOSIS — R35.0 FREQUENCY OF MICTURITION: ICD-10-CM

## 2024-06-20 DIAGNOSIS — T83.490A OTHER MECHANICAL COMPLICATION OF IMPLANTED PENILE PROSTHESIS, INITIAL ENCOUNTER: ICD-10-CM

## 2024-06-20 PROCEDURE — 51798 US URINE CAPACITY MEASURE: CPT

## 2024-06-20 PROCEDURE — 99024 POSTOP FOLLOW-UP VISIT: CPT

## 2024-06-20 NOTE — ASSESSMENT
[FreeTextEntry1] : SUTURE REMOVAL NOTE: Incision: Healing well, edges well approximated, no redness, swelling or drainage present. Drainage: No drainage present.  Number of sutures removed: 5 Incision re-examined and no retained proline suture noted.  Site appearance post procedure: clean and intact Site care post procedure: site cleansed with alcohol swap, Mastisol was applied and steri strips applied with skin edges well approximated. The patient tolerated the procedure well. Complications; None   Recommendation/ patient instructions: Follow up appointment.  BLADDER SCAN: Indication: Increased frequency of urination. Initial Volume:    cc PVR:  133 cc Results: Urinary retention Recommendations: No Therapy Needed.

## 2024-06-20 NOTE — PLAN
[TextEntry] : A/P, 73M w/ ED, s/p RR of IPP 6/5/24 - sutures removed today - f/u 2 wk r/t hernia  The patient is 14 days following insertion of inflatable penile implant.   He is doing well.  He was provided today with appropriate postop instructions.   A 2-week postop assessment was discussed with the patient, and he was provided with additional information on how to inflate and deflate his device.   His sutures were removed without difficulty. He was told to return to the office should he develop difficulty with cycling of the device.  If he continues to do well, he was told to return for a 3 month follow, otherwise he should return sooner.

## 2024-07-08 ENCOUNTER — APPOINTMENT (OUTPATIENT)
Dept: UROLOGY | Facility: CLINIC | Age: 74
End: 2024-07-08
Payer: MEDICARE

## 2024-07-08 VITALS
TEMPERATURE: 97.2 F | HEIGHT: 64 IN | WEIGHT: 170 LBS | DIASTOLIC BLOOD PRESSURE: 64 MMHG | SYSTOLIC BLOOD PRESSURE: 114 MMHG | BODY MASS INDEX: 29.02 KG/M2

## 2024-07-08 DIAGNOSIS — Z48.816 ENCOUNTER FOR SURGICAL AFTERCARE FOLLOWING SURGERY ON THE GENITOURINARY SYSTEM: ICD-10-CM

## 2024-07-08 DIAGNOSIS — R35.0 FREQUENCY OF MICTURITION: ICD-10-CM

## 2024-07-08 DIAGNOSIS — T83.410D BREAKDOWN (MECHANICAL) OF IMPLANTED PENILE PROSTHESIS, SUBSEQUENT ENCOUNTER: ICD-10-CM

## 2024-07-08 DIAGNOSIS — Z87.438 PERSONAL HISTORY OF OTHER DISEASES OF MALE GENITAL ORGANS: ICD-10-CM

## 2024-07-08 DIAGNOSIS — Z09 ENCOUNTER FOR FOLLOW-UP EXAMINATION AFTER COMPLETED TREATMENT FOR CONDITIONS OTHER THAN MALIGNANT NEOPLASM: ICD-10-CM

## 2024-07-08 PROCEDURE — 99024 POSTOP FOLLOW-UP VISIT: CPT

## 2024-07-08 PROCEDURE — 51798 US URINE CAPACITY MEASURE: CPT

## 2024-09-25 ENCOUNTER — APPOINTMENT (OUTPATIENT)
Dept: ORTHOPEDIC SURGERY | Facility: CLINIC | Age: 74
End: 2024-09-25
Payer: MEDICARE

## 2024-09-25 VITALS — BODY MASS INDEX: 28.17 KG/M2 | WEIGHT: 165 LBS | HEIGHT: 64 IN

## 2024-09-25 DIAGNOSIS — M47.812 SPONDYLOSIS W/OUT MYELOPATHY OR RADICULOPATHY, CERVICAL REGION: ICD-10-CM

## 2024-09-25 DIAGNOSIS — M51.36 OTHER INTERVERTEBRAL DISC DEGENERATION, LUMBAR REGION: ICD-10-CM

## 2024-09-25 PROCEDURE — 72040 X-RAY EXAM NECK SPINE 2-3 VW: CPT

## 2024-09-25 PROCEDURE — 99214 OFFICE O/P EST MOD 30 MIN: CPT

## 2024-09-25 PROCEDURE — 72100 X-RAY EXAM L-S SPINE 2/3 VWS: CPT

## 2024-09-25 NOTE — ADDENDUM
[FreeTextEntry1] : This note was written by Romana Desir on 09/25/2024 acting as scribe for Dr. Karlo Gipson M.D.  I, Karlo Gipson MD, have read and attest that all the information, medical decision making and discharge instructions within are true and accurate.

## 2024-09-25 NOTE — PHYSICAL EXAM
[Normal] : Gait: normal [Koroma's Sign] : negative Koroma's sign [Pronator Drift] : negative pronator drift [SLR] : negative straight leg raise [de-identified] : 5 out of 5 motor strength, sensation is intact and symmetrical full range of motion flexion extension and rotation, no palpatory tenderness full range of motion of hips knees shoulders and elbows (all four extremities), no atrophy, negative straight leg raise, no pathological reflexes, no swelling, normal ambulation, no apparent distress skin is intact, no palpable lymph nodes, no upper or lower extremity instability, alert and oriented x3 and normal mood. Normal finger-to nose test.  [de-identified] : XR AP Lat Cervical 09/25/2024 - Cervical degenerative disc disease-reviewed with patient.    XR AP Lat Lumbar 09/25/2024 - Lumbar degenerative disc disease -reviewed with patient.    XR AP Lat Cervical 03/20/2023 -Cervical degenerative disc disease- reviewed with patient.

## 2024-09-25 NOTE — HISTORY OF PRESENT ILLNESS
[de-identified] : 74 year old [place]ic Speaking male presents for evaluation of chronic neck and lower back pain x 2 years.  Last seen in Apr 2023 for neck pain, had tried PT at the time and was referred to pain management. He states the pain is localized to the neck and lower back, and is mostly right sided. He also complains of right shoulder pain as well.  Denies numbness/tingling. Denies weakness.  He has taken tylenol for the pain. Has last tried PT x 2 years ago x 10 weeks, which he states it did not help. He states he does not want to try PT again as it did not help.  Denies ROSE. PMHx: HTN, DM, HLD  No fever chills sweats nausea vomiting no bowel or bladder dysfunction, no recent weight loss or gain no night pain. This history is in addition to the intake form that I personally reviewed.

## 2024-09-25 NOTE — DISCUSSION/SUMMARY
[de-identified] : Lumbar degenerative disc disease Cervical degenerative disc disease Discussed all options. MRI Cervical  F/U after MRI Referral to pain management  All options discussed including rest, medicine, home exercise, acupuncture, Chiropractic care, Physical Therapy, Pain management, and last resort surgery. All questions were answered, all alternatives discussed and the patient is in complete agreement with the treatment plan which the patient contributed to and discussed with me through the shared decision making process. Follow-up appointment as instructed. Any issues and the patient will call or come in sooner.  Visit was by

## 2024-10-01 ENCOUNTER — APPOINTMENT (OUTPATIENT)
Dept: MRI IMAGING | Facility: CLINIC | Age: 74
End: 2024-10-01
Payer: MEDICARE

## 2024-10-01 ENCOUNTER — OUTPATIENT (OUTPATIENT)
Dept: OUTPATIENT SERVICES | Facility: HOSPITAL | Age: 74
LOS: 1 days | End: 2024-10-01
Payer: MEDICARE

## 2024-10-01 DIAGNOSIS — Z96.0 PRESENCE OF UROGENITAL IMPLANTS: Chronic | ICD-10-CM

## 2024-10-01 DIAGNOSIS — M47.812 SPONDYLOSIS WITHOUT MYELOPATHY OR RADICULOPATHY, CERVICAL REGION: ICD-10-CM

## 2024-10-01 DIAGNOSIS — M54.41 LUMBAGO WITH SCIATICA, RIGHT SIDE: ICD-10-CM

## 2024-10-01 DIAGNOSIS — Z90.49 ACQUIRED ABSENCE OF OTHER SPECIFIED PARTS OF DIGESTIVE TRACT: Chronic | ICD-10-CM

## 2024-10-01 PROCEDURE — 72141 MRI NECK SPINE W/O DYE: CPT

## 2024-10-01 PROCEDURE — 72141 MRI NECK SPINE W/O DYE: CPT | Mod: 26

## 2024-10-07 ENCOUNTER — APPOINTMENT (OUTPATIENT)
Dept: UROLOGY | Facility: CLINIC | Age: 74
End: 2024-10-07

## 2024-10-23 ENCOUNTER — APPOINTMENT (OUTPATIENT)
Dept: ORTHOPEDIC SURGERY | Facility: CLINIC | Age: 74
End: 2024-10-23
Payer: MEDICARE

## 2024-10-23 VITALS — HEIGHT: 64 IN | BODY MASS INDEX: 26.46 KG/M2 | WEIGHT: 155 LBS

## 2024-10-23 DIAGNOSIS — M47.812 SPONDYLOSIS W/OUT MYELOPATHY OR RADICULOPATHY, CERVICAL REGION: ICD-10-CM

## 2024-10-23 PROCEDURE — 99214 OFFICE O/P EST MOD 30 MIN: CPT

## (undated) DEVICE — SUT PROLENE 4-0 30" KS

## (undated) DEVICE — ELCTR BOVIE PENCIL SMOKE EVACUATION

## (undated) DEVICE — SUPP ATHLETIC MALE XLG 44-55IN

## (undated) DEVICE — GLV 9 PROTEXIS (WHITE)

## (undated) DEVICE — DRSG DERMABOND 0.7ML

## (undated) DEVICE — DRAPE 3/4 SHEET 52X76"

## (undated) DEVICE — DRAIN URINARY LEG BAG WITH FLIP-FLO VALVE 32OZ

## (undated) DEVICE — SUT VICRYL 3-0 27" RB-1 UNDYED

## (undated) DEVICE — DRAPE SURGICAL #1010

## (undated) DEVICE — VENODYNE/SCD SLEEVE CALF MEDIUM

## (undated) DEVICE — LONE STAR DILAMEZINSERT INSERTER BLUE 12MM

## (undated) DEVICE — WARMING BLANKET UPPER ADULT

## (undated) DEVICE — DRSG TAPE MICROFOAM 3"

## (undated) DEVICE — DRSG TAPE UMBILICAL COTTON 2" X 30 X 1/8"

## (undated) DEVICE — PACK PROST PENILE LNX SURGICOUNT

## (undated) DEVICE — SUT PDS II 3-0 27" RB-1